# Patient Record
(demographics unavailable — no encounter records)

---

## 2025-07-30 NOTE — ASSESSMENT
[FreeTextEntry1] : Strong family hx of breast cancer BIRADS 2 breast imaging 7/2025 TC: 11% Breast pain likely costochondritis L>R Rec Advil/Motrin  Recommend vitamin e and/or primrose prn for breast pain Cont yearly breast imaging 7/2026  RTO 1 year

## 2025-07-30 NOTE — ADDENDUM
[FreeTextEntry1] : I, Concha Beltrán, acted solely as a scribe for Dr. Raf Vazquez on this date 07/30/2025.

## 2025-07-30 NOTE — PHYSICAL EXAM
[Normal] : supple, no neck mass and thyroid not enlarged [Normal Neck Lymph Nodes] : normal neck lymph nodes  [Normal Supraclavicular Lymph Nodes] : normal supraclavicular lymph nodes [Normal Groin Lymph Nodes] : normal groin lymph nodes [Normal Axillary Lymph Nodes] : normal axillary lymph nodes [Normal] : oriented to person, place and time, with appropriate affect [de-identified] : no masses or adenopathy bilaterally. tenderness over costochondral junctions L>R.

## 2025-07-30 NOTE — HISTORY OF PRESENT ILLNESS
[de-identified] : Patient is a 60 y/o F who was last seen in 2022 by my colleague Dr. Arnold West.  She is here to establish a breast surgeon. Pt reports sciatica and being bedridden for past couple of months w/ increased weight gain, will be seeing Dr. Peters of neurology for imaging.  MMG/US 7/23/25 - No mammographic or sonographic evidence of malignancy. (TC: 11.7%, BIRADS 2)  MMG/US 8/23/2023 - No mammographic or sonographic evidence of malignancy. (TC: 17.2%, BIRADS 2)  Mammo/sono was completed on 8/2021 which was without evidence of malignancy (Birads 2).  US Echo of Pelvis 6/21/21: No focal abnormality at the site of clinical concern in the left inner thigh and the left groin.  Her history is notable for a benign biopsy of a right breast mass in 2011.  She was also found to have bilateral symmetric nodularity involving both upper outer breast quadrants on exam with no worrisome findings on prior imaging.  She has a family history of breast cancer involving her mother diagnosed at 72 who had bilateral disease.  Her mother is BRCA negative. Maternal aunt had breast cancer.  Shannon is also of Ashkenazi descent.  KASIE Risk Score= 20%  GYN: Edu Aceves MD

## 2025-07-30 NOTE — HISTORY OF PRESENT ILLNESS
[de-identified] : Patient is a 62 y/o F who was last seen in 2022 by my colleague Dr. Arnold West.  She is here to establish a breast surgeon. Pt reports sciatica and being bedridden for past couple of months w/ increased weight gain, will be seeing Dr. Peters of neurology for imaging.  MMG/US 7/23/25 - No mammographic or sonographic evidence of malignancy. (TC: 11.7%, BIRADS 2)  MMG/US 8/23/2023 - No mammographic or sonographic evidence of malignancy. (TC: 17.2%, BIRADS 2)  Mammo/sono was completed on 8/2021 which was without evidence of malignancy (Birads 2).  US Echo of Pelvis 6/21/21: No focal abnormality at the site of clinical concern in the left inner thigh and the left groin.  Her history is notable for a benign biopsy of a right breast mass in 2011.  She was also found to have bilateral symmetric nodularity involving both upper outer breast quadrants on exam with no worrisome findings on prior imaging.  She has a family history of breast cancer involving her mother diagnosed at 72 who had bilateral disease.  Her mother is BRCA negative. Maternal aunt had breast cancer.  Shannon is also of Ashkenazi descent.  KASIE Risk Score= 20%  GYN: Edu Aceves MD

## 2025-07-30 NOTE — PHYSICAL EXAM
[Normal] : supple, no neck mass and thyroid not enlarged [Normal Neck Lymph Nodes] : normal neck lymph nodes  [Normal Supraclavicular Lymph Nodes] : normal supraclavicular lymph nodes [Normal Groin Lymph Nodes] : normal groin lymph nodes [Normal Axillary Lymph Nodes] : normal axillary lymph nodes [Normal] : oriented to person, place and time, with appropriate affect [de-identified] : no masses or adenopathy bilaterally. tenderness over costochondral junctions L>R.

## 2025-07-30 NOTE — CONSULT LETTER
[Dear  ___] : Dear  [unfilled], [Consult Letter:] : I had the pleasure of evaluating your patient, [unfilled]. [Please see my note below.] : Please see my note below. [Consult Closing:] : Thank you very much for allowing me to participate in the care of this patient.  If you have any questions, please do not hesitate to contact me. [Sincerely,] : Sincerely, [FreeTextEntry2] : Edu Aceves MD